# Patient Record
Sex: FEMALE | Race: BLACK OR AFRICAN AMERICAN | NOT HISPANIC OR LATINO | Employment: OTHER | URBAN - NONMETROPOLITAN AREA
[De-identification: names, ages, dates, MRNs, and addresses within clinical notes are randomized per-mention and may not be internally consistent; named-entity substitution may affect disease eponyms.]

---

## 2017-11-16 ENCOUNTER — OFFICE VISIT (OUTPATIENT)
Dept: FAMILY MEDICINE CLINIC | Facility: CLINIC | Age: 67
End: 2017-11-16

## 2017-11-16 VITALS
TEMPERATURE: 98.1 F | DIASTOLIC BLOOD PRESSURE: 68 MMHG | RESPIRATION RATE: 16 BRPM | HEIGHT: 65 IN | OXYGEN SATURATION: 99 % | BODY MASS INDEX: 30.32 KG/M2 | HEART RATE: 86 BPM | WEIGHT: 182 LBS | SYSTOLIC BLOOD PRESSURE: 110 MMHG

## 2017-11-16 DIAGNOSIS — R68.89 ITCHY EYES: ICD-10-CM

## 2017-11-16 DIAGNOSIS — H10.13 ALLERGIC CONJUNCTIVITIS OF BOTH EYES: ICD-10-CM

## 2017-11-16 DIAGNOSIS — J30.1 ACUTE ALLERGIC RHINITIS DUE TO POLLEN, UNSPECIFIED SEASONALITY: Primary | ICD-10-CM

## 2017-11-16 PROCEDURE — 96372 THER/PROPH/DIAG INJ SC/IM: CPT | Performed by: NURSE PRACTITIONER

## 2017-11-16 PROCEDURE — 99213 OFFICE O/P EST LOW 20 MIN: CPT | Performed by: NURSE PRACTITIONER

## 2017-11-16 RX ORDER — FLUTICASONE PROPIONATE 50 MCG
2 SPRAY, SUSPENSION (ML) NASAL DAILY
Qty: 16 G | Refills: 5 | Status: SHIPPED | OUTPATIENT
Start: 2017-11-16 | End: 2022-04-21

## 2017-11-16 RX ORDER — HYDROCODONE BITARTRATE AND ACETAMINOPHEN 7.5; 325 MG/1; MG/1
1 TABLET ORAL
COMMUNITY
End: 2021-05-19 | Stop reason: DRUGHIGH

## 2017-11-16 RX ORDER — OLOPATADINE HYDROCHLORIDE 2 MG/ML
1 SOLUTION/ DROPS OPHTHALMIC DAILY
Qty: 2.5 ML | Refills: 0 | Status: SHIPPED | OUTPATIENT
Start: 2017-11-16 | End: 2017-11-16 | Stop reason: ALTCHOICE

## 2017-11-16 RX ORDER — KETOTIFEN FUMARATE 0.35 MG/ML
1 SOLUTION/ DROPS OPHTHALMIC 2 TIMES DAILY
Qty: 10 ML | Refills: 0 | Status: SHIPPED | OUTPATIENT
Start: 2017-11-16 | End: 2018-01-31

## 2017-11-16 RX ORDER — ZOLPIDEM TARTRATE 10 MG/1
10 TABLET ORAL
COMMUNITY
Start: 2016-05-05

## 2017-11-16 RX ORDER — METHYLPREDNISOLONE ACETATE 80 MG/ML
80 INJECTION, SUSPENSION INTRA-ARTICULAR; INTRALESIONAL; INTRAMUSCULAR; SOFT TISSUE ONCE
Status: COMPLETED | OUTPATIENT
Start: 2017-11-16 | End: 2017-11-16

## 2017-11-16 RX ORDER — CIPROFLOXACIN HYDROCHLORIDE 3.5 MG/ML
SOLUTION/ DROPS TOPICAL
COMMUNITY
Start: 2017-11-14 | End: 2018-01-31

## 2017-11-16 RX ORDER — ERGOCALCIFEROL 1.25 MG/1
50000 CAPSULE ORAL
COMMUNITY
Start: 2017-08-15 | End: 2018-01-31 | Stop reason: SDUPTHER

## 2017-11-16 RX ORDER — NITROGLYCERIN 0.4 MG/1
0.4 TABLET SUBLINGUAL
COMMUNITY
Start: 2017-10-30 | End: 2018-10-26

## 2017-11-16 RX ORDER — ALPRAZOLAM 0.25 MG/1
0.25 TABLET ORAL
COMMUNITY
Start: 2016-05-05

## 2017-11-16 RX ORDER — ERGOCALCIFEROL 1.25 MG/1
50000 CAPSULE ORAL
COMMUNITY
Start: 2017-08-15

## 2017-11-16 RX ORDER — CETIRIZINE HYDROCHLORIDE 10 MG/1
TABLET ORAL
COMMUNITY
Start: 2017-06-30 | End: 2021-05-19

## 2017-11-16 RX ORDER — HYDROXYZINE HYDROCHLORIDE 25 MG/1
TABLET, FILM COATED ORAL
Qty: 90 TABLET | Refills: 0 | Status: SHIPPED | OUTPATIENT
Start: 2017-11-16 | End: 2018-01-31

## 2017-11-16 RX ORDER — PANTOPRAZOLE SODIUM 20 MG/1
20 TABLET, DELAYED RELEASE ORAL
COMMUNITY
Start: 2017-10-30 | End: 2018-10-31

## 2017-11-16 RX ADMIN — METHYLPREDNISOLONE ACETATE 80 MG: 80 INJECTION, SUSPENSION INTRA-ARTICULAR; INTRALESIONAL; INTRAMUSCULAR; SOFT TISSUE at 13:54

## 2017-11-16 NOTE — PROGRESS NOTES
"Subjective   Sabi Jarvis is a 67 y.o. female who presents to the office complaining of itchy eyes.    History of Present Illness     Patient states that for the past several days she has had itchy watery eyes that burn when they're watery bilaterally.  Patient states that she went to [facility] care couple days ago and they prescribed her ciprofloxacin drops, patient stated that she got better over the next couple of days but then last night and today she has been much worse where all his symptoms have returned.  Patient states that eyes are watery with clear drainage she does have a small amount of \"eye boogers\" in the morning but they are light yellow in color and her eyes are not matted together.  Patient takes Zyrtec when necessary but switched to Benadryl when necessary recently with these issues.  Patient has been using the Cipro floxacillin drops as well as warm compresses.  Patient states that the last couple of days the warm compresses have made the problems worse.  Patient states that nothing got caught in her eyes.  She states no eye pain.  Patient's main complaint is itchiness and she feels like something is underneath her eyelids.  Patient also experiencing rhinorrhea, postnasal drainage, and some sneezing.  Patient states that this summer she had her vision checked.  Patient states that she has a mild headache because of her eyes hurting.    The following portions of the patient's history were reviewed and updated as appropriate: allergies, current medications, past family history, past medical history, past social history, past surgical history and problem list.    Review of Systems   Constitutional: Negative for activity change, appetite change, chills, diaphoresis, fatigue and fever.   HENT: Positive for postnasal drip, rhinorrhea, sneezing and sore throat. Negative for congestion, ear pain, sinus pain, sinus pressure, tinnitus, trouble swallowing and voice change.    Eyes: Positive for discharge " "(watery clear), redness and itching. Negative for photophobia, pain and visual disturbance.   Respiratory: Negative for cough, chest tightness, shortness of breath and wheezing.    Cardiovascular: Negative for chest pain, palpitations and leg swelling.   Neurological: Positive for headaches (mild). Negative for dizziness, weakness and light-headedness.       History reviewed. No pertinent past medical history.    History reviewed. No pertinent family history.       Objective   /68  Pulse 86  Temp 98.1 °F (36.7 °C) (Oral)   Resp 16  Ht 65\" (165.1 cm)  Wt 182 lb (82.6 kg)  SpO2 99%  Breastfeeding? No  BMI 30.29 kg/m2  Physical Exam   Constitutional: She appears well-developed and well-nourished. No distress.   HENT:   Head: Normocephalic.   Right Ear: Hearing, tympanic membrane, external ear and ear canal normal.   Left Ear: Tympanic membrane, external ear and ear canal normal.   Nose: Mucosal edema and rhinorrhea present. Right sinus exhibits no maxillary sinus tenderness and no frontal sinus tenderness. Left sinus exhibits no maxillary sinus tenderness and no frontal sinus tenderness.   Mouth/Throat: Uvula is midline and mucous membranes are normal. Oropharyngeal exudate present. Tonsils are 0 on the right. Tonsils are 0 on the left. No tonsillar exudate.   Eyes: EOM and lids are normal. Pupils are equal, round, and reactive to light. Right eye exhibits discharge (Watery clear drainage). No foreign body present in the right eye. Left eye exhibits discharge (watery clear drainage). No foreign body present in the left eye. Right conjunctiva is injected. Left conjunctiva is injected. No scleral icterus.   Cardiovascular: Normal rate, regular rhythm, normal heart sounds and intact distal pulses.  Exam reveals no gallop and no friction rub.    No murmur heard.  Pulmonary/Chest: Effort normal and breath sounds normal. No respiratory distress. She has no wheezes. She has no rales.   Lymphadenopathy:     She " has no cervical adenopathy.   Psychiatric: She has a normal mood and affect. Her behavior is normal.   Nursing note and vitals reviewed.       PHQ-2/PHQ-9 Depression Screening 11/16/2017   Little interest or pleasure in doing things 2   Feeling down, depressed, or hopeless 3   Trouble falling or staying asleep, or sleeping too much 3   Feeling tired or having little energy 0   Poor appetite or overeating 0   Feeling bad about yourself - or that you are a failure or have let yourself or your family down 0   Trouble concentrating on things, such as reading the newspaper or watching television 0   Moving or speaking so slowly that other people could have noticed. Or the opposite - being so fidgety or restless that you have been moving around a lot more than usual 0   Thoughts that you would be better off dead, or of hurting yourself in some way 0   Total Score 8   If you checked off any problems, how difficult have these problems made it for you to do your work, take care of things at home, or get along with other people? Not difficult at all         Assessment/Plan   Sabi was seen today for itchy eye.    Diagnoses and all orders for this visit:    Acute allergic rhinitis due to pollen, unspecified seasonality  -     methylPREDNISolone acetate (DEPO-medrol) injection 80 mg; Inject 1 mL into the shoulder, thigh, or buttocks 1 (One) Time.  -     fluticasone (FLONASE) 50 MCG/ACT nasal spray; 2 sprays into each nostril Daily.    Allergic conjunctivitis of both eyes  -     ketotifen (ZADITOR) 0.025 % ophthalmic solution; Administer 1 drop to both eyes 2 (Two) Times a Day.  -     Discontinue: olopatadine (PATADAY) 0.2 % solution ophthalmic solution; Administer 1 drop to both eyes Daily. For allergic conjunctivitis  -     methylPREDNISolone acetate (DEPO-medrol) injection 80 mg; Inject 1 mL into the shoulder, thigh, or buttocks 1 (One) Time.    Itchy eyes  -     ketotifen (ZADITOR) 0.025 % ophthalmic solution; Administer 1  drop to both eyes 2 (Two) Times a Day.  -     Discontinue: olopatadine (PATADAY) 0.2 % solution ophthalmic solution; Administer 1 drop to both eyes Daily. For allergic conjunctivitis  -     hydrOXYzine (ATARAX) 25 MG tablet; Take 1-2 tablets prn TID for itching.      Allergies with allergic conjunctivitis-called pharmacy and told them to only give patient the generic of Zaditor, to hold the Pataday, prescribed Atarax at bedtime for itching that patient can take during the day but to caution with drowsiness and not to drive with, prescribed Flonase and Depo-Medrol for allergies control.  Educated to stop Benadryl she starts the hydroxyzine     Patient educated to follow-up sooner than next scheduled appointment if condition(s) worse or do not improve. Patient states understanding and is in agreeance with plan of care. An After Visit Summary was printed and given to the patient.      Current Outpatient Prescriptions:   •  ALPRAZolam (XANAX) 0.25 MG tablet, Take 0.25 mg by mouth., Disp: , Rfl:   •  aspirin 81 MG tablet, Take 81 mg by mouth., Disp: , Rfl:   •  cetirizine (zyrTEC) 10 MG tablet, TAKE 1 TABLET BY MOUTH DAILY., Disp: , Rfl:   •  ciprofloxacin (CILOXAN) 0.3 % ophthalmic solution, , Disp: , Rfl:   •  ergocalciferol (ERGOCALCIFEROL) 00090 units capsule, Take 50,000 Units by mouth., Disp: , Rfl:   •  HYDROcodone-acetaminophen (NORCO) 7.5-325 MG per tablet, Take 1 tablet by mouth., Disp: , Rfl:   •  nitroglycerin (NITROSTAT) 0.4 MG SL tablet, Place 0.4 mg under the tongue., Disp: , Rfl:   •  pantoprazole (PROTONIX) 20 MG EC tablet, Take 20 mg by mouth., Disp: , Rfl:   •  vitamin D (ERGOCALCIFEROL) 11328 units capsule capsule, , Disp: , Rfl:   •  zolpidem (AMBIEN) 10 MG tablet, Take 10 mg by mouth., Disp: , Rfl:   •  fluticasone (FLONASE) 50 MCG/ACT nasal spray, 2 sprays into each nostril Daily., Disp: 16 g, Rfl: 5  •  hydrOXYzine (ATARAX) 25 MG tablet, Take 1-2 tablets prn TID for itching., Disp: 90 tablet, Rfl:  0  •  ketotifen (ZADITOR) 0.025 % ophthalmic solution, Administer 1 drop to both eyes 2 (Two) Times a Day., Disp: 10 mL, Rfl: 0  No current facility-administered medications for this visit.       YAAKOV Pacheco        This document has been electronically signed by YAAKOV Pacheco on November 16, 2017 4:29 PM

## 2018-01-31 ENCOUNTER — OFFICE VISIT (OUTPATIENT)
Dept: OBSTETRICS AND GYNECOLOGY | Facility: CLINIC | Age: 68
End: 2018-01-31

## 2018-01-31 VITALS
HEART RATE: 78 BPM | SYSTOLIC BLOOD PRESSURE: 124 MMHG | HEIGHT: 65 IN | DIASTOLIC BLOOD PRESSURE: 70 MMHG | BODY MASS INDEX: 30.12 KG/M2 | WEIGHT: 180.8 LBS | RESPIRATION RATE: 18 BRPM

## 2018-01-31 DIAGNOSIS — L65.9 HAIR THINNING: ICD-10-CM

## 2018-01-31 DIAGNOSIS — Z12.31 SCREENING MAMMOGRAM, ENCOUNTER FOR: ICD-10-CM

## 2018-01-31 DIAGNOSIS — R21 RASH AND NONSPECIFIC SKIN ERUPTION: Primary | ICD-10-CM

## 2018-01-31 PROCEDURE — 99213 OFFICE O/P EST LOW 20 MIN: CPT | Performed by: NURSE PRACTITIONER

## 2018-01-31 RX ORDER — CLOTRIMAZOLE AND BETAMETHASONE DIPROPIONATE 10; .64 MG/G; MG/G
CREAM TOPICAL EVERY 12 HOURS SCHEDULED
Qty: 45 G | Refills: 0 | Status: SHIPPED | OUTPATIENT
Start: 2018-01-31 | End: 2019-03-26

## 2018-02-13 ENCOUNTER — OFFICE VISIT (OUTPATIENT)
Dept: OBSTETRICS AND GYNECOLOGY | Facility: CLINIC | Age: 68
End: 2018-02-13

## 2018-02-13 VITALS
SYSTOLIC BLOOD PRESSURE: 128 MMHG | BODY MASS INDEX: 30.35 KG/M2 | HEART RATE: 74 BPM | DIASTOLIC BLOOD PRESSURE: 74 MMHG | WEIGHT: 182.2 LBS | RESPIRATION RATE: 16 BRPM | HEIGHT: 65 IN

## 2018-02-13 DIAGNOSIS — Z12.72 VAGINAL PAP SMEAR: ICD-10-CM

## 2018-02-13 DIAGNOSIS — Z01.419 ENCOUNTER FOR GYNECOLOGICAL EXAMINATION: Primary | ICD-10-CM

## 2018-02-13 PROCEDURE — G0101 CA SCREEN;PELVIC/BREAST EXAM: HCPCS | Performed by: NURSE PRACTITIONER

## 2018-02-13 PROCEDURE — G0123 SCREEN CERV/VAG THIN LAYER: HCPCS | Performed by: PATHOLOGY

## 2018-02-13 NOTE — PROGRESS NOTES
Subjective   Chief Complaint   Patient presents with   • Gynecologic Exam     well woman annual visit     Sabi Jarvis is a 67 y.o. year old  presenting to be seen for her annual exam.  She denies any complaints. Previous rash under left breast is improving. She has not used the medication regularly as she went out of town and forgot to take it. She has resumed regular use and is noting great improvement.     She is sexually active.  In the past 12 months there has not been new sexual partners.  Condoms are not typically used.  She would not like to be screened for STD's at today's exam.     She exercises regularly: yes.  She wears her seat belt:yes.  She has concerns about domestic violence: no.  She is taking Vit D and Calcium:yes  Last colonoscopy:   Last DEXA: Unsure of date  Last MM2016, hx of breast cyst with removal  Last PAP: possibly  with Dr. Mosley  Hx of abnormal pap: No      SURGICAL MENOPAUSE  LMP: Hysterectomy without BSO at age 36 secondary to fibroids and bleeding concerns.    OB History      Para Term  AB Living    1 1        SAB TAB Ectopic Multiple Live Births                  No Additional Complaints Reported    The following portions of the patient's history were reviewed and updated as appropriate:problem list, current medications, allergies, past family history, past medical history, past social history and past surgical history.    Smoking status: Former Smoker                                                              Packs/day: 0.00      Years: 0.00      Smokeless status: Never Used                        Review of Systems   Constitutional: Negative.    Respiratory: Negative.    Cardiovascular: Negative.    Gastrointestinal: Negative.  Negative for constipation and diarrhea.   Endocrine: Negative.    Genitourinary: Negative.  Negative for vaginal discharge. Dyspareunia: vaginal dryness, used premarin in the past with mildi improvement   "  Musculoskeletal: Positive for back pain.   Skin: Positive for rash (improving, see HPI).   Neurological: Negative for dizziness, syncope, light-headedness and headaches.   Psychiatric/Behavioral: Negative for suicidal ideas. The patient is not nervous/anxious.         Anxiety and depression currently controlled         Objective   /74 (BP Location: Right arm, Patient Position: Sitting, Cuff Size: Adult)  Pulse 74  Resp 16  Ht 165.1 cm (65\")  Wt 82.6 kg (182 lb 3.2 oz)  LMP  (LMP Unknown) Comment: around age 36   Breastfeeding? No  BMI 30.32 kg/m2    General:  well developed; well nourished  no acute distress   Skin:  No suspicious lesions seen  hyperpigmented rash beneath left breast improving. No excoriations or lesions   Cardiac: Heart sounds are normal.  Regular rate and rhythm without murmur, gallop or rub.   Resp:  Normal expansion.  Clear to auscultation.  No rales, rhonchi, or wheezing.   Thyroid: not examined   Breasts:  Examined in supine position  Symmetric without masses or skin dimpling  Nipples normal without inversion, lesions or discharge  There are no palpable axillary nodes   Abdomen: soft, non-tender; no masses  no umbilical or inginual hernias are present  no hepato-splenomegaly  Normal findings: bowel sounds normal   Psych: alert,oriented, in NAD with a full range of affect, normal behavior and no psychotic features   Pelvis: Clinical staff was present for exam  External genitalia:  normal appearance of the external genitalia including Bartholin's and [place]'s glands.  :  urethral meatus normal;  Vaginal:  Atrophic mucosa without prolapse or lesions. Some mild white discharge noted.  Cervix:  absent.  Uterus:  absent.  Adnexa:  non palpable bilaterally.  Rectal:  anus visually normal appearing. recto-vaginal exam unremarkable and confirms findings; guaiac negative;     Lab Review   CBC, CMP and UA    Imaging  No data reviewed       Diagnoses and all orders for this " visit:    Encounter for gynecological examination  -     Liquid-based Pap Smear, Screening - ThinPrep Vial, Vagina    Vaginal Pap smear  -     Liquid-based Pap Smear, Screening - ThinPrep Vial, Vagina    Pap results: I will send card in mail or call if abnormal. RTC every 2 years for well woman exam. MMG is scheduled on 3/1/18.       This note was electronically signed.    Dyan Infante, APRN  February 13, 2018

## 2018-02-20 LAB
LAB AP CASE REPORT: NORMAL
LAB AP GYN ADDITIONAL INFORMATION: NORMAL
Lab: NORMAL
PATH INTERP SPEC-IMP: NORMAL
STAT OF ADQ CVX/VAG CYTO-IMP: NORMAL

## 2019-03-26 ENCOUNTER — OFFICE VISIT (OUTPATIENT)
Dept: OBSTETRICS AND GYNECOLOGY | Facility: CLINIC | Age: 69
End: 2019-03-26

## 2019-03-26 VITALS
BODY MASS INDEX: 30.72 KG/M2 | HEART RATE: 78 BPM | TEMPERATURE: 98.2 F | WEIGHT: 184.4 LBS | HEIGHT: 65 IN | DIASTOLIC BLOOD PRESSURE: 78 MMHG | SYSTOLIC BLOOD PRESSURE: 120 MMHG

## 2019-03-26 DIAGNOSIS — R10.2 PELVIC PAIN: Primary | ICD-10-CM

## 2019-03-26 DIAGNOSIS — K59.03 DRUG-INDUCED CONSTIPATION: ICD-10-CM

## 2019-03-26 PROCEDURE — 99214 OFFICE O/P EST MOD 30 MIN: CPT | Performed by: NURSE PRACTITIONER

## 2019-03-26 RX ORDER — RANITIDINE 150 MG/1
TABLET ORAL
COMMUNITY
Start: 2019-02-28 | End: 2021-05-19

## 2019-03-26 RX ORDER — OMEPRAZOLE 20 MG/1
CAPSULE, DELAYED RELEASE ORAL
COMMUNITY
Start: 2019-02-28 | End: 2021-05-19

## 2019-03-26 NOTE — PROGRESS NOTES
"Subjective   Saib Jarvis is a 68 y.o. female.     History of Present Illness   Pt presents with complaints of left sided tenderness in the pelvis and abdomen. She states this has been occurring off and on for 2 months now. She describes it as a \"pressure\", worsened by leaning this area against something like the kitchen counter. Has had occasional sharp, shooting pains. Last time was yesterday. Pt denies any pain today. Has not taken anything for the pain.     Pt does admit to having constipation issues. On opioids daily for chronic pain syndrome. Last colonscopy in 2016. Took Moura Tablets yesterday but has not had a bowel movement.     Pt is not sexually active. Denies vaginal discharge, itching, burning, odor. Denies dysuria, urgency, frequency.       S/P hysterectomy without BSO at age 36.     The following portions of the patient's history were reviewed and updated as appropriate: allergies, current medications, past family history, past medical history, past social history, past surgical history and problem list.    Review of Systems   Constitutional: Negative.  Negative for chills and fever.   Respiratory: Negative.    Cardiovascular: Negative.    Gastrointestinal: Positive for abdominal pain (LLQ) and constipation. Negative for anal bleeding, blood in stool, diarrhea, nausea and vomiting.   Genitourinary: Positive for pelvic pain (left sided). Negative for dysuria, flank pain, frequency, hematuria, urgency, vaginal discharge and vaginal pain.       Objective    Vitals:    03/26/19 0841   BP: 120/78   Pulse: 78   Temp: 98.2 °F (36.8 °C)         03/26/19  0841   Weight: 83.6 kg (184 lb 6.4 oz)     Body mass index is 30.69 kg/m².    Physical Exam   Constitutional: She is oriented to person, place, and time. She appears well-developed and well-nourished.   Cardiovascular: Normal rate, regular rhythm and normal heart sounds.   Pulmonary/Chest: Effort normal and breath sounds normal.   Abdominal: Soft. She " exhibits no distension and no mass. Bowel sounds are decreased. There is tenderness in the left lower quadrant. There is no rigidity, no rebound and no guarding.       Genitourinary: There is no rash, tenderness, lesion or injury on the right labia. There is no rash, tenderness, lesion or injury on the left labia. Uterus is absent.   Cervix is absent. Right adnexum is non-palpable.Left adnexum is non-palpable.Vagina exhibits no lesion and no loss of rugae (mild). No erythema, tenderness or bleeding in the vagina. No foreign body in the vagina. No signs of injury around the vagina. No vaginal discharge found.   Neurological: She is alert and oriented to person, place, and time.   Vitals reviewed.        Assessment/Plan   Sabi was seen today for pelvic pain.    Diagnoses and all orders for this visit:    Pelvic pain  -     US Non-ob Transvaginal    Drug-induced constipation    Obtain TVUS for evaluation of the ovaries as they are not palpable on exam. Last viewed on TVUS in 2014 were normal. I will call pt with results and discuss next steps PRN.     We discussed possible etiology of her pain being constipation. I discussed the anatomy that would cause constipation to be uncomfortable in the LLQ. I provided information on Miralax. Instructed pt to take one scoop a day for 7 days to help relieve constipation. Can repeat PRN. If ovaries normal on TVUS and pain persists, follow up with PCP for further evaluation of pain. Pt agrees with plan of care.

## 2020-04-01 RX ORDER — FLUCONAZOLE 150 MG/1
150 TABLET ORAL ONCE
Qty: 2 TABLET | Refills: 0 | Status: SHIPPED | OUTPATIENT
Start: 2020-04-01 | End: 2020-04-01

## 2021-05-19 ENCOUNTER — LAB (OUTPATIENT)
Dept: LAB | Facility: OTHER | Age: 71
End: 2021-05-19

## 2021-05-19 ENCOUNTER — OFFICE VISIT (OUTPATIENT)
Dept: OBSTETRICS AND GYNECOLOGY | Facility: CLINIC | Age: 71
End: 2021-05-19

## 2021-05-19 VITALS
HEART RATE: 75 BPM | BODY MASS INDEX: 29.22 KG/M2 | DIASTOLIC BLOOD PRESSURE: 88 MMHG | WEIGHT: 175.4 LBS | SYSTOLIC BLOOD PRESSURE: 136 MMHG | HEIGHT: 65 IN

## 2021-05-19 DIAGNOSIS — D48.5 NEOPLASM OF UNCERTAIN BEHAVIOR OF SKIN: ICD-10-CM

## 2021-05-19 DIAGNOSIS — Z01.419 ENCOUNTER FOR WELL WOMAN EXAM WITH ROUTINE GYNECOLOGICAL EXAM: Primary | ICD-10-CM

## 2021-05-19 PROBLEM — G89.29 CHRONIC RIGHT-SIDED LOW BACK PAIN WITH RIGHT-SIDED SCIATICA: Status: ACTIVE | Noted: 2020-10-27

## 2021-05-19 PROBLEM — M54.41 CHRONIC RIGHT-SIDED LOW BACK PAIN WITH RIGHT-SIDED SCIATICA: Status: ACTIVE | Noted: 2020-10-27

## 2021-05-19 PROBLEM — G56.03 BILATERAL CARPAL TUNNEL SYNDROME: Status: ACTIVE | Noted: 2019-07-08

## 2021-05-19 PROCEDURE — G0101 CA SCREEN;PELVIC/BREAST EXAM: HCPCS | Performed by: NURSE PRACTITIONER

## 2021-05-19 RX ORDER — HYDROCODONE BITARTRATE AND ACETAMINOPHEN 10; 325 MG/1; MG/1
1 TABLET ORAL 4 TIMES DAILY
COMMUNITY
Start: 2021-04-24

## 2021-05-19 RX ORDER — NAPROXEN 500 MG/1
500 TABLET ORAL 2 TIMES DAILY
COMMUNITY
Start: 2021-04-21 | End: 2021-11-16 | Stop reason: ALTCHOICE

## 2021-05-19 RX ORDER — ALLOPURINOL 300 MG/1
TABLET ORAL
COMMUNITY
Start: 2021-02-23

## 2021-05-19 NOTE — PROGRESS NOTES
Subjective   Chief Complaint   Patient presents with   • Gynecologic Exam     WWE   • mole     has two that she wants looked at     Shannan Webb is a 70 y.o. year old  presenting to be seen for her annual exam.  She denies any complaints. But wants me to assess a mole on the left beast and one in the right groin. Groin has been present and unchanging for years. Left breast pt hadn't ever noticed until 3 weeks ago. Not changed in 3 weeks but feels it showed up rather quickly. Neither itch, nor are they painful.     She is sexually active.  In the past 12 months there has not been new sexual partners.  Condoms are not typically used.  She would not like to be screened for STD's at today's exam.     She exercises regularly: yes.  She wears her seat belt:yes.  She has concerns about domestic violence: no.  She is taking Vit D and Calcium:yes  Last colonoscopy: 2020  Last DEXA: 12/3/2020, osteopenia  Last MM/3/2020, BIRADS 2, hx of breast cyst with removal  Last PAP:18  Hx of abnormal pap: No      SURGICAL MENOPAUSE  LMP: Hysterectomy without BSO at age 36 secondary to fibroids and bleeding concerns.    OB History        1    Para   1    Term   1            AB        Living   1       SAB        TAB        Ectopic        Molar        Multiple        Live Births                    No Additional Complaints Reported    The following portions of the patient's history were reviewed and updated as appropriate:problem list, current medications, allergies, past family history, past medical history, past social history and past surgical history.    Social History    Tobacco Use      Smoking status: Former Smoker      Smokeless tobacco: Never Used      Tobacco comment: quit 20 + years ago    Review of Systems   Constitutional: Negative.    Respiratory: Negative.    Cardiovascular: Negative.    Gastrointestinal: Negative.  Negative for constipation and diarrhea.   Endocrine: Negative.   "  Genitourinary: Negative.  Negative for vaginal discharge. Dyspareunia:     Musculoskeletal: Positive for back pain (chronic).   Skin: Negative for rash.        \"mole\"   Neurological: Negative for dizziness, syncope, light-headedness and headaches.   Psychiatric/Behavioral: Negative for suicidal ideas. The patient is not nervous/anxious.         Anxiety and depression currently controlled         Objective   /88   Pulse 75   Ht 165.1 cm (65\")   Wt 79.6 kg (175 lb 6.4 oz)   LMP  (LMP Unknown) Comment: around age 36, wo BSO  Breastfeeding No   BMI 29.19 kg/m²     General:  well developed; well nourished  no acute distress   Skin:  Mole(s) noted on left breast, 11:00 position, high toward chest wall. irregular borders, dark  brown, slightly rough texture. Right groin, small 2mm, smooth texture and borders   Several other nevi under the breasts     Cardiac: Heart sounds are normal.  Regular rate and rhythm without murmur, gallop or rub.   Resp:  Normal expansion.  Clear to auscultation.  No rales, rhonchi, or wheezing.   Thyroid: not examined   Breasts:  Examined in supine position  Symmetric without masses or skin dimpling  Nipples normal without inversion, lesions or discharge  There are no palpable axillary nodes   Abdomen: soft, non-tender; no masses  no umbilical or inginual hernias are present  no hepato-splenomegaly  Normal findings: bowel sounds normal   Psych: alert,oriented, in NAD with a full range of affect, normal behavior and no psychotic features   Pelvis: Clinical staff was present for exam  External genitalia:  normal appearance of the external genitalia including Bartholin's and [place]'s glands.  :  urethral meatus normal;  Vaginal:  Mildly atrophic mucosa without prolapse or lesions  Cervix:  absent.  Uterus:  absent.  Adnexa:  non palpable bilaterally.  Rectal:  anus visually normal appearing. recto-vaginal exam not performed     Lab Review   No data reviewed    Imaging  DEXA  Mammogram " report       Diagnoses and all orders for this visit:    Encounter for well woman exam with routine gynecological exam  -     Liquid-based Pap Smear, Screening    Neoplasm of uncertain behavior of skin  -     Ambulatory Referral to Dermatology    Pap results: I will send card in mail or call if abnormal. RTC every 2 years for well woman exam.    MMG recommended annually. Is up to date at this time. DEXA up to date as well. Due 12/2022. Osteopenia recommend continuing Vit D and weight bearing exercises.     Pt has had COVID vaccine.     Overweight with BMI 29.2. She voices understanding of need for a healthy balanced diet and exercise.     FU with PCP for any concerns and routine visits/labs.     I discussed skin findings. I recommend she go see a dermatologist for further evaluation and management, particularly for the one on the breast. She has seen Dr. Jansen before. We will refer to GLORIA Lewis at his office. Pt agrees with this plan of care.     This note was electronically signed.    Dyan Infante, APRN  May 19, 2021

## 2021-05-21 LAB
LAB AP CASE REPORT: NORMAL
PATH INTERP SPEC-IMP: NORMAL

## 2021-10-12 ENCOUNTER — OFFICE VISIT (OUTPATIENT)
Dept: ORTHOPEDIC SURGERY | Facility: CLINIC | Age: 71
End: 2021-10-12

## 2021-10-12 VITALS
OXYGEN SATURATION: 96 % | DIASTOLIC BLOOD PRESSURE: 80 MMHG | WEIGHT: 181.9 LBS | HEART RATE: 74 BPM | HEIGHT: 65 IN | BODY MASS INDEX: 30.31 KG/M2 | SYSTOLIC BLOOD PRESSURE: 126 MMHG

## 2021-10-12 DIAGNOSIS — M76.822 INSUFFICIENCY OF LEFT POSTERIOR TIBIAL TENDON: Primary | ICD-10-CM

## 2021-10-12 DIAGNOSIS — M25.572 CHRONIC PAIN OF LEFT ANKLE: ICD-10-CM

## 2021-10-12 DIAGNOSIS — G89.29 CHRONIC PAIN OF LEFT ANKLE: ICD-10-CM

## 2021-10-12 DIAGNOSIS — M21.42 ACQUIRED FLAT FOOT, LEFT: ICD-10-CM

## 2021-10-12 PROCEDURE — 99203 OFFICE O/P NEW LOW 30 MIN: CPT | Performed by: ORTHOPAEDIC SURGERY

## 2021-10-12 NOTE — PROGRESS NOTES
Sabi Jarvis is a 71 y.o. female   Primary provider:  Candice Milian MD       Chief Complaint   Patient presents with   • Left Ankle - Pain       HISTORY OF PRESENT ILLNESS:   Patient is here today for left ankle pain. She brought disc with xrays.  Patient was seen by orthopedics at another facility.  She has been having pain on the medial aspect of her left ankle for at least 3 months.  No specific injury that she recalls.  She reports a pulling and no swelling on the inside of her right ankle.  Pain has caused her to be decreased in activity.  She has worn a fracture boot but it rubbed on her ankle and she could not tolerate wearing it.  She states it did not really help any.  She has tried Naprosyn, ice, rest without significant improvement.  She states that when she rests consistently she may be feels a little bit better.  However, she still has pain with activity.    Pain  This is a chronic problem. The current episode started more than 1 year ago. The problem occurs intermittently. The problem has been gradually worsening. Associated symptoms include arthralgias. The symptoms are aggravated by standing. She has tried ice, heat and rest for the symptoms. The treatment provided mild relief.        CONCURRENT MEDICAL HISTORY:    Past Medical History:   Diagnosis Date   • Acid reflux    • Allergic rhinitis    • Anxiety    • Depression    • Fibrocystic breast    • Senile osteoporosis    • Uterine fibroid    • Yeast infection        Allergies   Allergen Reactions   • Erythromycin GI Intolerance         Current Outpatient Medications:   •  allopurinol (ZYLOPRIM) 300 MG tablet, TAKE 1 TABLET BY MOUTH ONCE DAILY FOR GOUT, Disp: , Rfl:   •  ALPRAZolam (XANAX) 0.25 MG tablet, Take 0.25 mg by mouth., Disp: , Rfl:   •  fluticasone (FLONASE) 50 MCG/ACT nasal spray, 2 sprays into each nostril Daily., Disp: 16 g, Rfl: 5  •  HYDROcodone-acetaminophen (NORCO)  MG per tablet, Take 1 tablet by mouth 4 (Four) Times  "a Day., Disp: , Rfl:   •  naproxen (NAPROSYN) 500 MG tablet, Take 500 mg by mouth 2 (Two) Times a Day., Disp: , Rfl:   •  vitamin D (ERGOCALCIFEROL) 38562 units capsule capsule, , Disp: , Rfl:   •  zolpidem (AMBIEN) 10 MG tablet, Take 10 mg by mouth., Disp: , Rfl:   •  aspirin 81 MG tablet, Take 81 mg by mouth., Disp: , Rfl:     Past Surgical History:   Procedure Laterality Date   • BREAST BIOPSY Bilateral    • BREAST SURGERY     • SUBTOTAL HYSTERECTOMY      wo BSO       Family History   Problem Relation Age of Onset   • Osteoporosis Mother    • Coronary artery disease Mother    • Cancer Other    • Diabetes Other    • Hypertension Other    • Ulcers Other    • Other Other         colon problems       Social History     Socioeconomic History   • Marital status:    Tobacco Use   • Smoking status: Former Smoker   • Smokeless tobacco: Never Used   • Tobacco comment: quit 20 + years ago   Substance and Sexual Activity   • Alcohol use: Yes     Comment: occasionally    • Drug use: No   • Sexual activity: Not Currently     Partners: Male     Birth control/protection: Surgical        Review of Systems   Constitutional: Negative.    HENT: Negative.    Eyes: Negative.    Respiratory: Negative.    Cardiovascular: Negative.    Gastrointestinal: Negative.    Endocrine: Negative.    Genitourinary: Negative.    Musculoskeletal: Positive for arthralgias.        Stiffness   Skin: Negative.    Allergic/Immunologic: Negative.    Neurological: Negative.    Hematological: Negative.    Psychiatric/Behavioral: Positive for sleep disturbance.        Anxiety/Depression       PHYSICAL EXAMINATION:       /80   Pulse 74   Ht 165.1 cm (65\")   Wt 82.5 kg (181 lb 14.4 oz)   LMP  (LMP Unknown) Comment: around age 36, wo BSO  SpO2 96%   BMI 30.27 kg/m²     Physical Exam  Constitutional:       General: She is not in acute distress.     Appearance: Normal appearance.   Pulmonary:      Effort: Pulmonary effort is normal. No " respiratory distress.   Neurological:      Mental Status: She is alert and oriented to person, place, and time.         GAIT:     []  Normal  []  Antalgic    Assistive device: []  None  []  Walker     []  Crutches  []  Cane     []  Wheelchair  []  Stretcher    Left Ankle Exam     Comments:  Mild diffuse tenderness along the posterior aspect of the medial malleolus.  She has flexible flatfoot deformity.  She does have good ankle plantarflexion and dorsiflexion.  Good stability with inversion and eversion.  Good distal pulses and sensation.  Mild diffuse swelling and no erythema.                  XR LEFT FOOT  Three views of left foot plus AP ankle show planovalgus deformity with lateral peritalar subluxation. No acute findings.       Indication: Ankle pain with no reported trauma.     Technique: GE 3T. Prone ax FS PD. Supine sag T1/FS T2, ax/cor FS T2, no contrast.     Findings: The posterior tibial tendon is swollen and abnormal in morphology on series 4, images 8-17, consistent with partial tearing, perhaps a vertical split tear. There is also fluid signal in the PTT sheath.     The Achilles tendon is normal in morphology and signal. The peroneal and anterior tendon groups are normal. Remaining medial ankle tendons are unremarkable.     Series 2, image 17 and series 5, image 26 demonstrate focal marrow edema within the lateral aspect of the talar dome, suggesting overlying chondral thinning. There is normal signal in the sinus tarsi. There is an os trigonum, normal variant.     Series 4, image 25 and series 3, image 10 demonstrate a 1 cm oval fluid collection at the plantar aspect of the midfoot which is suspected to represent a small synovial cyst of the first TMT joint.     No other marrow edema is identified. The deltoid ligament components are maintained. ATFL fibers are preserved. No arthritic change or plantar fascia inflammation is evident. There is a plantar calcaneal spur.     IMPRESSION:     1. Partial  tearing posterior tibial tendon with associated tenosynovitis.   2. Lateral talar dome chondral thinning suspected.   3. Small probable synovial cyst plantar aspect first TMT articulation.     [id number]  Specimen Collected: 09/17/21  3:54 PM Last Resulted: 09/17/21  4:02 PM         ASSESSMENT:    Diagnoses and all orders for this visit:    Insufficiency of left posterior tibial tendon  -     Ambulatory Referral to Physical Therapy Evaluate and treat    Chronic pain of left ankle  -     Ambulatory Referral to Physical Therapy Evaluate and treat    Acquired flat foot, left  -     Ambulatory Referral to Physical Therapy Evaluate and treat          PLAN    We discussed possible orthotics to help with medial arch support.  We also discussed general strength and conditioning exercises.  She is going to be careful with shoe shoe wear to ensure that she has adequate medial arch support.  We discussed the possibility of further evaluation for reconstructive options.  We will try physical therapy for general strength and conditioning exercises as well as home exercise program.    PT:   ROM/STR exercises left ankle   Conditioning exercises   Has post tibial insuffiency   Trying inserts for support of medial arch.    Return in about 6 weeks (around 11/23/2021) for recheck.    Eric Jerez MD

## 2021-11-16 ENCOUNTER — OFFICE VISIT (OUTPATIENT)
Dept: ORTHOPEDIC SURGERY | Facility: CLINIC | Age: 71
End: 2021-11-16

## 2021-11-16 VITALS — BODY MASS INDEX: 30.16 KG/M2 | WEIGHT: 181 LBS | HEIGHT: 65 IN

## 2021-11-16 DIAGNOSIS — M21.42 ACQUIRED FLAT FOOT, LEFT: ICD-10-CM

## 2021-11-16 DIAGNOSIS — M25.572 CHRONIC PAIN OF LEFT ANKLE: Primary | ICD-10-CM

## 2021-11-16 DIAGNOSIS — G89.29 CHRONIC PAIN OF LEFT ANKLE: Primary | ICD-10-CM

## 2021-11-16 DIAGNOSIS — M76.822 INSUFFICIENCY OF LEFT POSTERIOR TIBIAL TENDON: ICD-10-CM

## 2021-11-16 PROCEDURE — 99214 OFFICE O/P EST MOD 30 MIN: CPT | Performed by: ORTHOPAEDIC SURGERY

## 2021-11-16 RX ORDER — MELOXICAM 15 MG/1
TABLET ORAL
Qty: 30 TABLET | Refills: 3 | Status: SHIPPED | OUTPATIENT
Start: 2021-11-16 | End: 2022-04-21

## 2021-11-16 NOTE — PROGRESS NOTES
"Sabi Jarvis is a 71 y.o. female returns for     Chief Complaint   Patient presents with   • Left Ankle - Follow-up       HISTORY OF PRESENT ILLNESS:  Patient in for follow up on left ankle pain  She states that she is doing better.  Pain is improving.    She does state that the anti-inflammatory medicine she was taking does not seem to be effective.  She is wearing a shoe insert on the left side.  She does think that physical therapy was helpful.       CONCURRENT MEDICAL HISTORY:    The following portions of the patient's history were reviewed and updated as appropriate: allergies, current medications, past family history, past medical history, past social history, past surgical history and problem list.     ROS  No fevers or chills.  No chest pain or shortness of air.  No GI or  disturbances.    PHYSICAL EXAMINATION:       Ht 165.1 cm (65\")   Wt 82.1 kg (181 lb)   LMP  (LMP Unknown) Comment: around age 36, wo BSO  BMI 30.12 kg/m²     Physical Exam  Constitutional:       General: She is not in acute distress.     Appearance: Normal appearance.   Pulmonary:      Effort: Pulmonary effort is normal. No respiratory distress.   Neurological:      Mental Status: She is alert and oriented to person, place, and time.         GAIT:     []  Normal  []  Antalgic    Assistive device: []  None  []  Walker     []  Crutches  []  Cane     []  Wheelchair  []  Stretcher    Left Ankle Exam     Comments:  Mild diffuse tenderness along the posterior aspect of the medial malleolus.  She has flexible flatfoot deformity.  She does have good ankle plantarflexion and dorsiflexion.  Good stability with inversion and eversion.  Good distal pulses and sensation.  No significant swelling and no erythema.                      ASSESSMENT:    Diagnoses and all orders for this visit:    Chronic pain of left ankle    Insufficiency of left posterior tibial tendon    Acquired flat foot, left    Other orders  -     meloxicam (MOBIC) 15 MG tablet; " 1 PO Daily with food.          PLAN    We discussed changing anti-inflammatory medicine and she was written a prescription for meloxicam.  She will continue using an insert for support in her left shoe.  Continue with home exercises and general strength and conditioning exercises.  Slowly increase activity as pain allows.  Follow-up as needed.      Return if symptoms worsen or fail to improve, for recheck.    Eric Jerez MD

## 2022-04-21 ENCOUNTER — OFFICE VISIT (OUTPATIENT)
Dept: FAMILY MEDICINE CLINIC | Facility: CLINIC | Age: 72
End: 2022-04-21

## 2022-04-21 VITALS
DIASTOLIC BLOOD PRESSURE: 82 MMHG | OXYGEN SATURATION: 97 % | HEIGHT: 65 IN | BODY MASS INDEX: 30.16 KG/M2 | WEIGHT: 181 LBS | SYSTOLIC BLOOD PRESSURE: 124 MMHG | HEART RATE: 80 BPM

## 2022-04-21 DIAGNOSIS — B37.2 YEAST INFECTION OF THE SKIN: Primary | ICD-10-CM

## 2022-04-21 PROCEDURE — 99213 OFFICE O/P EST LOW 20 MIN: CPT | Performed by: NURSE PRACTITIONER

## 2022-04-21 RX ORDER — NAPROXEN 375 MG/1
375 TABLET ORAL
COMMUNITY

## 2022-04-21 RX ORDER — FLUCONAZOLE 150 MG/1
150 TABLET ORAL ONCE
Qty: 1 TABLET | Refills: 0 | Status: SHIPPED | OUTPATIENT
Start: 2022-04-21 | End: 2022-04-21

## 2022-04-21 RX ORDER — KETOCONAZOLE 20 MG/G
1 CREAM TOPICAL DAILY
Qty: 80 G | Refills: 0 | Status: SHIPPED | OUTPATIENT
Start: 2022-04-21 | End: 2022-12-15

## 2022-04-21 NOTE — PROGRESS NOTES
CC: Rash (Under both breast, x2 weeks, itches and burns )      Subjective:  Sabi Jarvis is a 71 y.o. female who presents for     Patient of Dr. Milian presents office with complaints of rash under both breasts x2 weeks.  She states that it itches and burns.    Rash  This is a new problem. The current episode started 1 to 4 weeks ago. Location: Under breasts. The rash is characterized by burning and itchiness. She was exposed to nothing. Pertinent negatives include no anorexia, congestion, cough, diarrhea, eye pain, facial edema, fatigue, fever, joint pain, nail changes, rhinorrhea, shortness of breath, sore throat or vomiting. Treatments tried: Neosporin. The treatment provided no relief.       The following portions of the patient's history were reviewed and updated as appropriate: allergies, current medications, past family history, past medical history, past social history, past surgical history and problem list.    Past Medical History:   Diagnosis Date   • Acid reflux    • Allergic rhinitis    • Anxiety    • Depression    • Fibrocystic breast    • Senile osteoporosis    • Uterine fibroid    • Yeast infection          Current Outpatient Medications:   •  allopurinol (ZYLOPRIM) 300 MG tablet, TAKE 1 TABLET BY MOUTH ONCE DAILY FOR GOUT, Disp: , Rfl:   •  ALPRAZolam (XANAX) 0.25 MG tablet, Take 0.25 mg by mouth., Disp: , Rfl:   •  HYDROcodone-acetaminophen (NORCO)  MG per tablet, Take 1 tablet by mouth 4 (Four) Times a Day., Disp: , Rfl:   •  naproxen (NAPROSYN) 375 MG tablet, Take 375 mg by mouth., Disp: , Rfl:   •  vitamin D (ERGOCALCIFEROL) 78302 units capsule capsule, , Disp: , Rfl:   •  zolpidem (AMBIEN) 10 MG tablet, Take 10 mg by mouth., Disp: , Rfl:   •  fluconazole (Diflucan) 150 MG tablet, Take 1 tablet by mouth 1 (One) Time for 1 dose., Disp: 1 tablet, Rfl: 0  •  ketoconazole (NIZORAL) 2 % cream, Apply 1 application topically to the appropriate area as directed Daily., Disp: 80 g, Rfl: 0    Review  "of Systems    Review of Systems   Constitutional: Negative.  Negative for fatigue and fever.   HENT: Negative.  Negative for congestion, rhinorrhea and sore throat.    Eyes: Negative.  Negative for pain.   Respiratory: Negative.  Negative for cough and shortness of breath.    Cardiovascular: Negative.    Gastrointestinal: Negative.  Negative for anorexia, diarrhea and vomiting.   Endocrine: Negative.    Genitourinary: Negative.    Musculoskeletal: Negative.  Negative for joint pain.   Skin: Positive for rash. Negative for nail changes.   Allergic/Immunologic: Negative.    Neurological: Negative.    Hematological: Negative.    Psychiatric/Behavioral: Negative.    All other systems reviewed and are negative.      Objective  Vitals:    04/21/22 1334   BP: 124/82   Pulse: 80   SpO2: 97%   Weight: 82.1 kg (181 lb)   Height: 165.1 cm (65\")     Body mass index is 30.12 kg/m².    Physical Exam    Physical Exam  Vitals and nursing note reviewed.   Constitutional:       General: She is not in acute distress.     Appearance: Normal appearance. She is not ill-appearing, toxic-appearing or diaphoretic.   HENT:      Head: Normocephalic and atraumatic.   Cardiovascular:      Rate and Rhythm: Normal rate and regular rhythm.      Pulses: Normal pulses.      Heart sounds: Normal heart sounds.   Pulmonary:      Effort: Pulmonary effort is normal. No respiratory distress.      Breath sounds: Normal breath sounds. No stridor. No wheezing, rhonchi or rales.   Chest:      Chest wall: No tenderness.      Comments: Pt has large pendulous breast. There is red rash noted under each breast with erythema and satellite lesions noted.   Musculoskeletal:      Cervical back: Normal range of motion and neck supple.   Neurological:      Mental Status: She is alert.             Diagnoses and all orders for this visit:    1. Yeast infection of the skin (Primary)  -     fluconazole (Diflucan) 150 MG tablet; Take 1 tablet by mouth 1 (One) Time for 1 " dose.  Dispense: 1 tablet; Refill: 0  -     ketoconazole (NIZORAL) 2 % cream; Apply 1 application topically to the appropriate area as directed Daily.  Dispense: 80 g; Refill: 0         With yeast infection of the skin.  We will treat with Diflucan 150 mg tablet and ketoconazole cream.  Patient instructed on how to take/use these medications and possible side effects.  Advised to keep area clean and dry.  She was advised to use deodorant in that area to help keep her dry.  Patient to keep scheduled follow-up with her primary care provider Dr. Milian.  Answered all questions.  Patient verbalized understanding of plan of care.        This document has been electronically signed by YAAKOV Self on April 21, 2022 13:46 CDT

## 2022-12-15 ENCOUNTER — OFFICE VISIT (OUTPATIENT)
Dept: GASTROENTEROLOGY | Facility: CLINIC | Age: 72
End: 2022-12-15

## 2022-12-15 VITALS
HEIGHT: 65 IN | SYSTOLIC BLOOD PRESSURE: 140 MMHG | HEART RATE: 81 BPM | WEIGHT: 186 LBS | BODY MASS INDEX: 30.99 KG/M2 | DIASTOLIC BLOOD PRESSURE: 80 MMHG

## 2022-12-15 DIAGNOSIS — K59.00 CONSTIPATION, UNSPECIFIED CONSTIPATION TYPE: ICD-10-CM

## 2022-12-15 DIAGNOSIS — K21.9 GASTROESOPHAGEAL REFLUX DISEASE, UNSPECIFIED WHETHER ESOPHAGITIS PRESENT: ICD-10-CM

## 2022-12-15 DIAGNOSIS — R19.4 CHANGE IN BOWEL HABITS: Primary | ICD-10-CM

## 2022-12-15 DIAGNOSIS — R10.12 CHRONIC LUQ PAIN: ICD-10-CM

## 2022-12-15 DIAGNOSIS — G89.29 CHRONIC LUQ PAIN: ICD-10-CM

## 2022-12-15 DIAGNOSIS — Z87.19 HISTORY OF HIATAL HERNIA: ICD-10-CM

## 2022-12-15 PROBLEM — Z98.890 HISTORY OF COLONOSCOPY: Status: ACTIVE | Noted: 2022-10-19

## 2022-12-15 PROBLEM — K80.20 CHOLELITHIASIS: Status: ACTIVE | Noted: 2022-04-01

## 2022-12-15 PROBLEM — R11.0 NAUSEA: Status: ACTIVE | Noted: 2022-11-11

## 2022-12-15 PROBLEM — K59.03 DRUG-INDUCED CONSTIPATION: Status: ACTIVE | Noted: 2021-10-28

## 2022-12-15 PROBLEM — K59.04 CHRONIC IDIOPATHIC CONSTIPATION: Status: ACTIVE | Noted: 2022-11-11

## 2022-12-15 PROBLEM — K57.90 DIVERTICULOSIS: Status: ACTIVE | Noted: 2022-11-11

## 2022-12-15 PROBLEM — Z79.1 NSAID LONG-TERM USE: Status: ACTIVE | Noted: 2022-10-19

## 2022-12-15 PROBLEM — L03.019 PARONYCHIA OF INDEX FINGER: Status: ACTIVE | Noted: 2021-10-28

## 2022-12-15 PROBLEM — K57.30 DIVERTICULOSIS OF LARGE INTESTINE WITHOUT HEMORRHAGE: Status: ACTIVE | Noted: 2022-11-11

## 2022-12-15 PROCEDURE — 99204 OFFICE O/P NEW MOD 45 MIN: CPT | Performed by: PHYSICIAN ASSISTANT

## 2022-12-15 RX ORDER — PANTOPRAZOLE SODIUM 40 MG/1
40 TABLET, DELAYED RELEASE ORAL DAILY
COMMUNITY

## 2022-12-15 RX ORDER — LINACLOTIDE 290 UG/1
290 CAPSULE, GELATIN COATED ORAL DAILY
COMMUNITY
Start: 2022-11-17 | End: 2023-01-26 | Stop reason: DRUGHIGH

## 2022-12-15 RX ORDER — SODIUM, POTASSIUM,MAG SULFATES 17.5-3.13G
1 SOLUTION, RECONSTITUTED, ORAL ORAL EVERY 12 HOURS
Qty: 354 ML | Refills: 0 | Status: SHIPPED | OUTPATIENT
Start: 2022-12-15

## 2022-12-15 RX ORDER — SODIUM CHLORIDE 9 MG/ML
40 INJECTION, SOLUTION INTRAVENOUS AS NEEDED
Status: CANCELLED | OUTPATIENT
Start: 2023-01-19

## 2022-12-15 RX ORDER — DEXTROSE AND SODIUM CHLORIDE 5; .45 G/100ML; G/100ML
30 INJECTION, SOLUTION INTRAVENOUS CONTINUOUS PRN
Status: CANCELLED | OUTPATIENT
Start: 2023-01-19

## 2022-12-15 NOTE — PROGRESS NOTES
[facility]      Chief Complaint:   Chief Complaint   Patient presents with   • Abdominal Pain       Subjective     HPI:   Patient presents for evaluation of chronic left upper quadrant pain.  Patient states pain has been present for years.  Patient states she had her gallbladder removed last year and attempt to relieve pain however that was unsuccessful.  Patient also notes change in bowel habits over the last 1 year.  States significant constipation now.  Patient reports that spicy foods, acidic foods and alcohol worsen her left upper quadrant pain.  Her PCP has recently started her on Protonix and Linzess 290 mcg daily.  Patient states she took the Linzess for 5 days and had almost complete relief of her pain however this medication is so expensive that she cannot afford to take it daily.    Patient had a CT of the abdomen and pelvis December 2021 that showed diverticulosis without acute diverticulitis.  Patient also had a negative gastric emptying study.  Patient had esophagram that showed mild dysmotility without significant delay in emptying.  Patient states she had colonoscopy that showed diverticulosis and EGD found a large hiatal hernia.    Assessment/plan:  1.  Chronic left upper quadrant pain/GERD/Hiatal hernia- EGD for further evaluation.  Advised to continue gastric irritant avoidance.  She will continue Protonix 40 mg once daily.  2.  Change in bowel habits/constipation- patient will be scheduled for colonoscopy for further evaluation.  She may continue to take Linzess 290 mcg, provided with samples.  Also encouraged patient to begin fiber supplementation.  Follow-up will planned after endoscopies have been completed for results and recommendations based upon findings.    Past Medical History:   Past Medical History:   Diagnosis Date   • Acid reflux    • Allergic rhinitis    • Anxiety    • Depression    • Fibrocystic breast    • Senile osteoporosis    • Uterine  fibroid    • Yeast infection        Past Surgical History:  Past Surgical History:   Procedure Laterality Date   • BREAST BIOPSY Bilateral    • BREAST SURGERY     • CHOLECYSTECTOMY  04/2022   • SUBTOTAL HYSTERECTOMY      wo BSO       Family History:  Family History   Problem Relation Age of Onset   • Osteoporosis Mother    • Coronary artery disease Mother    • Cancer Other    • Diabetes Other    • Hypertension Other    • Ulcers Other    • Other Other         colon problems       Social History:   reports that she has quit smoking. She has never used smokeless tobacco. She reports current alcohol use. She reports that she does not use drugs.    Medications:   Prior to Admission medications    Medication Sig Start Date End Date Taking? Authorizing Provider   allopurinol (ZYLOPRIM) 300 MG tablet TAKE 1 TABLET BY MOUTH ONCE DAILY FOR GOUT 2/23/21  Yes ProviderSumeet MD   ALPRAZolam (XANAX) 0.25 MG tablet Take 0.25 mg by mouth. 5/5/16  Yes Provider, MD Sumeet   HYDROcodone-acetaminophen (NORCO)  MG per tablet Take 1 tablet by mouth 4 (Four) Times a Day. 4/24/21  Yes Provider, MD Sumeet   Linzess 290 MCG capsule capsule Take 290 mcg by mouth Daily. 11/17/22  Yes Provider, MD Sumeet   naproxen (NAPROSYN) 375 MG tablet Take 375 mg by mouth.   Yes Provider, Historical, MD   pantoprazole (PROTONIX) 40 MG EC tablet Take 40 mg by mouth Daily.   Yes Provider, MD Sumeet   vitamin D (ERGOCALCIFEROL) 62271 units capsule capsule  8/15/17  Yes Provider, MD Sumeet   zolpidem (AMBIEN) 10 MG tablet Take 10 mg by mouth. 5/5/16  Yes Provider, MD Sumeet   sodium-potassium-magnesium sulfates (Suprep Bowel Prep Kit) 17.5-3.13-1.6 GM/177ML solution oral solution Take 1 bottle by mouth Every 12 (Twelve) Hours. 12/15/22   Anabelle Rosas PA-C   ketoconazole (NIZORAL) 2 % cream Apply 1 application topically to the appropriate area as directed Daily. 4/21/22 12/15/22  Sophy Weston APRN  "      Allergies:  Erythromycin    ROS:    Review of Systems   Constitutional: Negative.  Negative for fever and unexpected weight change.   HENT: Negative.  Negative for trouble swallowing.    Eyes: Negative.    Respiratory: Negative.  Negative for shortness of breath.    Cardiovascular: Negative.  Negative for chest pain.   Gastrointestinal: Positive for abdominal pain, constipation and nausea (occasional). Negative for abdominal distention, anal bleeding, blood in stool, diarrhea, rectal pain and vomiting.   Endocrine: Negative.    Genitourinary: Negative.    Skin: Negative.    Neurological: Negative.    Hematological: Negative.    Psychiatric/Behavioral: Negative.      Objective     Blood pressure 140/80, pulse 81, height 165.1 cm (65\"), weight 84.4 kg (186 lb), not currently breastfeeding.    Physical Exam  Vitals and nursing note reviewed.   Constitutional:       Appearance: Normal appearance.   HENT:      Head: Normocephalic and atraumatic.   Eyes:      General: No scleral icterus.     Conjunctiva/sclera: Conjunctivae normal.   Cardiovascular:      Rate and Rhythm: Normal rate and regular rhythm.   Pulmonary:      Effort: Pulmonary effort is normal.      Breath sounds: Normal breath sounds.   Abdominal:      General: Bowel sounds are normal.      Palpations: Abdomen is soft.      Tenderness: There is abdominal tenderness in the left upper quadrant and left lower quadrant. There is no guarding or rebound.   Skin:     General: Skin is warm.   Neurological:      General: No focal deficit present.      Mental Status: She is alert.   Psychiatric:         Behavior: Behavior normal.          Assessment & Plan   Diagnoses and all orders for this visit:    1. Change in bowel habits (Primary)  -     Case Request; Standing  -     sodium chloride 0.9 % infusion 40 mL  -     dextrose 5 % and sodium chloride 0.45 % infusion  -     Case Request    2. Constipation, unspecified constipation type  -     Case Request; " Standing  -     sodium chloride 0.9 % infusion 40 mL  -     dextrose 5 % and sodium chloride 0.45 % infusion  -     Case Request    3. Chronic LUQ pain  -     Case Request; Standing  -     sodium chloride 0.9 % infusion 40 mL  -     dextrose 5 % and sodium chloride 0.45 % infusion  -     Case Request    4. Gastroesophageal reflux disease, unspecified whether esophagitis present  -     Case Request; Standing  -     sodium chloride 0.9 % infusion 40 mL  -     dextrose 5 % and sodium chloride 0.45 % infusion  -     Case Request    5. History of hiatal hernia  -     Case Request; Standing  -     sodium chloride 0.9 % infusion 40 mL  -     dextrose 5 % and sodium chloride 0.45 % infusion  -     Case Request    Other orders  -     Follow Anesthesia Guidelines / Protocol; Future  -     Obtain Informed Consent; Future  -     Implement Anesthesia Orders Day of Procedure; Standing  -     Obtain Informed Consent; Standing  -     POC Glucose Once; Standing  -     Insert Peripheral IV; Standing  -     sodium-potassium-magnesium sulfates (Suprep Bowel Prep Kit) 17.5-3.13-1.6 GM/177ML solution oral solution; Take 1 bottle by mouth Every 12 (Twelve) Hours.  Dispense: 354 mL; Refill: 0        ESOPHAGOGASTRODUODENOSCOPY (N/A), COLONOSCOPY (N/A)     Diagnosis Plan   1. Change in bowel habits  Case Request    sodium chloride 0.9 % infusion 40 mL    dextrose 5 % and sodium chloride 0.45 % infusion    Case Request      2. Constipation, unspecified constipation type  Case Request    sodium chloride 0.9 % infusion 40 mL    dextrose 5 % and sodium chloride 0.45 % infusion    Case Request      3. Chronic LUQ pain  Case Request    sodium chloride 0.9 % infusion 40 mL    dextrose 5 % and sodium chloride 0.45 % infusion    Case Request      4. Gastroesophageal reflux disease, unspecified whether esophagitis present  Case Request    sodium chloride 0.9 % infusion 40 mL    dextrose 5 % and sodium chloride 0.45 % infusion    Case Request      5.  History of hiatal hernia  Case Request    sodium chloride 0.9 % infusion 40 mL    dextrose 5 % and sodium chloride 0.45 % infusion    Case Request          Anticipated Surgical Procedure:  Orders Placed This Encounter   Procedures   • Obtain Informed Consent     Standing Status:   Future     Order Specific Question:   Informed Consent Given For     Answer:   egd and colonoscopy       The risks, benefits, and alternatives of this procedure have been discussed with the patient or the responsible party- the patient understands and agrees to proceed.

## 2023-01-19 ENCOUNTER — ANESTHESIA EVENT (OUTPATIENT)
Dept: GASTROENTEROLOGY | Facility: HOSPITAL | Age: 73
End: 2023-01-19
Payer: MEDICARE

## 2023-01-19 ENCOUNTER — HOSPITAL ENCOUNTER (OUTPATIENT)
Facility: HOSPITAL | Age: 73
Setting detail: HOSPITAL OUTPATIENT SURGERY
Discharge: HOME | End: 2023-01-19
Attending: INTERNAL MEDICINE | Admitting: PHYSICIAN ASSISTANT
Payer: MEDICARE

## 2023-01-19 ENCOUNTER — ANESTHESIA (OUTPATIENT)
Dept: GASTROENTEROLOGY | Facility: HOSPITAL | Age: 73
End: 2023-01-19
Payer: MEDICARE

## 2023-01-19 VITALS
HEART RATE: 71 BPM | RESPIRATION RATE: 18 BRPM | WEIGHT: 182 LBS | BODY MASS INDEX: 30.32 KG/M2 | HEIGHT: 65 IN | DIASTOLIC BLOOD PRESSURE: 72 MMHG | SYSTOLIC BLOOD PRESSURE: 118 MMHG | OXYGEN SATURATION: 96 % | TEMPERATURE: 97.2 F

## 2023-01-19 DIAGNOSIS — Z87.19 HISTORY OF HIATAL HERNIA: ICD-10-CM

## 2023-01-19 DIAGNOSIS — G89.29 CHRONIC LUQ PAIN: ICD-10-CM

## 2023-01-19 DIAGNOSIS — R10.12 CHRONIC LUQ PAIN: ICD-10-CM

## 2023-01-19 DIAGNOSIS — K59.00 CONSTIPATION, UNSPECIFIED CONSTIPATION TYPE: ICD-10-CM

## 2023-01-19 DIAGNOSIS — R19.4 CHANGE IN BOWEL HABITS: ICD-10-CM

## 2023-01-19 DIAGNOSIS — K21.9 GASTROESOPHAGEAL REFLUX DISEASE, UNSPECIFIED WHETHER ESOPHAGITIS PRESENT: ICD-10-CM

## 2023-01-19 PROCEDURE — 25010000002 PROPOFOL 10 MG/ML EMULSION

## 2023-01-19 PROCEDURE — 43239 EGD BIOPSY SINGLE/MULTIPLE: CPT | Performed by: INTERNAL MEDICINE

## 2023-01-19 PROCEDURE — 45380 COLONOSCOPY AND BIOPSY: CPT | Performed by: INTERNAL MEDICINE

## 2023-01-19 PROCEDURE — 88305 TISSUE EXAM BY PATHOLOGIST: CPT

## 2023-01-19 RX ORDER — PROPOFOL 10 MG/ML
VIAL (ML) INTRAVENOUS AS NEEDED
Status: DISCONTINUED | OUTPATIENT
Start: 2023-01-19 | End: 2023-01-19 | Stop reason: SURG

## 2023-01-19 RX ORDER — SODIUM CHLORIDE 9 MG/ML
40 INJECTION, SOLUTION INTRAVENOUS AS NEEDED
Status: DISCONTINUED | OUTPATIENT
Start: 2023-01-19 | End: 2023-01-19 | Stop reason: HOSPADM

## 2023-01-19 RX ORDER — LIDOCAINE HYDROCHLORIDE 20 MG/ML
INJECTION, SOLUTION INTRAVENOUS AS NEEDED
Status: DISCONTINUED | OUTPATIENT
Start: 2023-01-19 | End: 2023-01-19 | Stop reason: SURG

## 2023-01-19 RX ORDER — DEXTROSE AND SODIUM CHLORIDE 5; .45 G/100ML; G/100ML
30 INJECTION, SOLUTION INTRAVENOUS CONTINUOUS PRN
Status: DISCONTINUED | OUTPATIENT
Start: 2023-01-19 | End: 2023-01-19 | Stop reason: HOSPADM

## 2023-01-19 RX ADMIN — DEXTROSE AND SODIUM CHLORIDE 30 ML/HR: 5; 450 INJECTION, SOLUTION INTRAVENOUS at 12:41

## 2023-01-19 RX ADMIN — LIDOCAINE HYDROCHLORIDE 80 MG: 20 INJECTION, SOLUTION INTRAVENOUS at 13:27

## 2023-01-19 RX ADMIN — PROPOFOL 70 MG: 10 INJECTION, EMULSION INTRAVENOUS at 13:27

## 2023-01-19 RX ADMIN — PROPOFOL 200 MG: 10 INJECTION, EMULSION INTRAVENOUS at 13:28

## 2023-01-19 NOTE — H&P
[facility]      Chief Complaint:   No chief complaint on file.      Subjective     HPI:   Patient presents for evaluation of chronic left upper quadrant pain.  Patient states pain has been present for years.  Patient states she had her gallbladder removed last year and attempt to relieve pain however that was unsuccessful.  Patient also notes change in bowel habits over the last 1 year.  States significant constipation now.  Patient reports that spicy foods, acidic foods and alcohol worsen her left upper quadrant pain.  Her PCP has recently started her on Protonix and Linzess 290 mcg daily.  Patient states she took the Linzess for 5 days and had almost complete relief of her pain however this medication is so expensive that she cannot afford to take it daily.    Patient had a CT of the abdomen and pelvis December 2021 that showed diverticulosis without acute diverticulitis.  Patient also had a negative gastric emptying study.  Patient had esophagram that showed mild dysmotility without significant delay in emptying.  Patient states she had colonoscopy that showed diverticulosis and EGD found a large hiatal hernia.    Assessment/plan:  1.  Chronic left upper quadrant pain/GERD/Hiatal hernia- EGD for further evaluation.  Advised to continue gastric irritant avoidance.  She will continue Protonix 40 mg once daily.  2.  Change in bowel habits/constipation- patient will be scheduled for colonoscopy for further evaluation.  She may continue to take Linzess 290 mcg, provided with samples.  Also encouraged patient to begin fiber supplementation.  Follow-up will planned after endoscopies have been completed for results and recommendations based upon findings.    Past Medical History:   Past Medical History:   Diagnosis Date   • Acid reflux    • Allergic rhinitis    • Anxiety    • Depression    • Fibrocystic breast    • Senile osteoporosis    • Uterine fibroid    • Yeast infection         Past Surgical History:    Past Surgical History:   Procedure Laterality Date   • BREAST BIOPSY Bilateral    • BREAST SURGERY     • CHOLECYSTECTOMY  04/2022   • SUBTOTAL HYSTERECTOMY      wo BSO       Family History:  Family History   Problem Relation Age of Onset   • Osteoporosis Mother    • Coronary artery disease Mother    • Cancer Other    • Diabetes Other    • Hypertension Other    • Ulcers Other    • Other Other         colon problems       Social History:   reports that she has quit smoking. She has never used smokeless tobacco. She reports current alcohol use. She reports that she does not use drugs.    Medications:   Prior to Admission medications    Medication Sig Start Date End Date Taking? Authorizing Provider   allopurinol (ZYLOPRIM) 300 MG tablet TAKE 1 TABLET BY MOUTH ONCE DAILY FOR GOUT 2/23/21  Yes ProviderSumeet MD   ALPRAZolam (XANAX) 0.25 MG tablet Take 0.25 mg by mouth. 5/5/16  Yes ProviderSumeet MD   HYDROcodone-acetaminophen (NORCO)  MG per tablet Take 1 tablet by mouth 4 (Four) Times a Day. 4/24/21  Yes ProviderSumeet MD   Linzess 290 MCG capsule capsule Take 290 mcg by mouth Daily. 11/17/22  Yes Provider, MD Sumeet   naproxen (NAPROSYN) 375 MG tablet Take 375 mg by mouth.   Yes Provider, MD Sumeet   pantoprazole (PROTONIX) 40 MG EC tablet Take 40 mg by mouth Daily.   Yes Provider, MD Sumeet   vitamin D (ERGOCALCIFEROL) 41612 units capsule capsule  8/15/17  Yes Provider, MD Sumeet   zolpidem (AMBIEN) 10 MG tablet Take 10 mg by mouth. 5/5/16  Yes Provider, MD Sumeet   sodium-potassium-magnesium sulfates (Suprep Bowel Prep Kit) 17.5-3.13-1.6 GM/177ML solution oral solution Take 1 bottle by mouth Every 12 (Twelve) Hours. 12/15/22   Anabelle Rosas PA-C   ketoconazole (NIZORAL) 2 % cream Apply 1 application topically to the appropriate area as directed Daily. 4/21/22 12/15/22  Sophy Weston APRN  "      Allergies:  Erythromycin    ROS:    Review of Systems   Constitutional: Negative.  Negative for fever and unexpected weight change.   HENT: Negative.  Negative for trouble swallowing.    Eyes: Negative.    Respiratory: Negative.  Negative for shortness of breath.    Cardiovascular: Negative.  Negative for chest pain.   Gastrointestinal: Positive for abdominal pain, constipation and nausea (occasional). Negative for abdominal distention, anal bleeding, blood in stool, diarrhea, rectal pain and vomiting.   Endocrine: Negative.    Genitourinary: Negative.    Skin: Negative.    Neurological: Negative.    Hematological: Negative.    Psychiatric/Behavioral: Negative.      Objective     Blood pressure 156/87, pulse 73, temperature 97.3 °F (36.3 °C), temperature source Temporal, resp. rate 18, height 165.1 cm (65\"), weight 82.6 kg (182 lb), SpO2 98 %, not currently breastfeeding.    Physical Exam  Vitals and nursing note reviewed.   Constitutional:       Appearance: Normal appearance.   HENT:      Head: Normocephalic and atraumatic.   Eyes:      General: No scleral icterus.     Conjunctiva/sclera: Conjunctivae normal.   Cardiovascular:      Rate and Rhythm: Normal rate and regular rhythm.   Pulmonary:      Effort: Pulmonary effort is normal.      Breath sounds: Normal breath sounds.   Abdominal:      General: Bowel sounds are normal.      Palpations: Abdomen is soft.      Tenderness: There is abdominal tenderness in the left upper quadrant and left lower quadrant. There is no guarding or rebound.   Skin:     General: Skin is warm.   Neurological:      General: No focal deficit present.      Mental Status: She is alert.   Psychiatric:         Behavior: Behavior normal.          Assessment & Plan   Diagnoses and all orders for this visit:    1. Change in bowel habits  -     sodium chloride 0.9 % infusion 40 mL  -     dextrose 5 % and sodium chloride 0.45 % infusion    2. Chronic LUQ pain  -     sodium chloride 0.9 % " infusion 40 mL  -     dextrose 5 % and sodium chloride 0.45 % infusion    3. History of hiatal hernia  -     sodium chloride 0.9 % infusion 40 mL  -     dextrose 5 % and sodium chloride 0.45 % infusion    4. Constipation, unspecified constipation type  -     sodium chloride 0.9 % infusion 40 mL  -     dextrose 5 % and sodium chloride 0.45 % infusion    5. Gastroesophageal reflux disease, unspecified whether esophagitis present  -     sodium chloride 0.9 % infusion 40 mL  -     dextrose 5 % and sodium chloride 0.45 % infusion    Other orders  -     Implement Anesthesia Orders Day of Procedure; Standing  -     Obtain Informed Consent; Standing  -     POC Glucose Once; Standing  -     Insert Peripheral IV; Standing  -     Implement Anesthesia Orders Day of Procedure  -     Obtain Informed Consent  -     POC Glucose Once  -     Insert Peripheral IV        ESOPHAGOGASTRODUODENOSCOPY (N/A), COLONOSCOPY (N/A)     Diagnosis Plan   1. Change in bowel habits  sodium chloride 0.9 % infusion 40 mL    dextrose 5 % and sodium chloride 0.45 % infusion      2. Chronic LUQ pain  sodium chloride 0.9 % infusion 40 mL    dextrose 5 % and sodium chloride 0.45 % infusion      3. History of hiatal hernia  sodium chloride 0.9 % infusion 40 mL    dextrose 5 % and sodium chloride 0.45 % infusion      4. Constipation, unspecified constipation type  sodium chloride 0.9 % infusion 40 mL    dextrose 5 % and sodium chloride 0.45 % infusion      5. Gastroesophageal reflux disease, unspecified whether esophagitis present  sodium chloride 0.9 % infusion 40 mL    dextrose 5 % and sodium chloride 0.45 % infusion          Anticipated Surgical Procedure:  Orders Placed This Encounter   Procedures   • Implement Anesthesia Orders Day of Procedure     Standing Status:   Standing     Number of Occurrences:   1   • Obtain Informed Consent     Standing Status:   Standing     Number of Occurrences:   1     Order Specific Question:   Informed Consent Given For      Answer:   egd and colonoscopy   • POC Glucose Once     Prior to Procedure on ALL Diabetic Patients     Standing Status:   Standing     Number of Occurrences:   1   • Insert Peripheral IV     Standing Status:   Standing     Number of Occurrences:   1       The risks, benefits, and alternatives of this procedure have been discussed with the patient or the responsible party- the patient understands and agrees to proceed.

## 2023-01-19 NOTE — ANESTHESIA POSTPROCEDURE EVALUATION
Patient: Sabi Jarvis    Procedure Summary     Date: 01/19/23 Room / Location: [facility] ENDOSCOPY 3 / [facility] ENDOSCOPY    Anesthesia Start: 1321 Anesthesia Stop: 1346    Procedures:       ESOPHAGOGASTRODUODENOSCOPY      COLONOSCOPY Diagnosis:       Change in bowel habits      Constipation, unspecified constipation type      Chronic LUQ pain      Gastroesophageal reflux disease, unspecified whether esophagitis present      History of hiatal hernia      (Change in bowel habits [R19.4])      (Constipation, unspecified constipation type [K59.00])      (Chronic LUQ pain [R10.12, G89.29])      (Gastroesophageal reflux disease, unspecified whether esophagitis present [K21.9])      (History of hiatal hernia [Z87.19])    Surgeons: Barb Ewing MD Provider: Barrington Fish CRNA    Anesthesia Type: general ASA Status: 2          Anesthesia Type: general    Vitals  No vitals data found for the desired time range.          Post Anesthesia Care and Evaluation    Patient location during evaluation: PHASE II  Patient participation: complete - patient cannot participate  Level of consciousness: sleepy but conscious  Pain score: 0  Pain management: adequate    Airway patency: patent  Anesthetic complications: No anesthetic complications  PONV Status: none  Cardiovascular status: acceptable  Respiratory status: acceptable  Hydration status: acceptable  No anesthesia care post op

## 2023-01-19 NOTE — ANESTHESIA PREPROCEDURE EVALUATION
Anesthesia Evaluation     Patient summary reviewed and Nursing notes reviewed   NPO Solid Status: > 8 hours  NPO Liquid Status: > 2 hours           Airway   Mallampati: II  TM distance: >3 FB  Neck ROM: full  No difficulty expected  Dental - normal exam     Pulmonary - negative pulmonary ROS and normal exam   Cardiovascular - negative cardio ROS and normal exam  Exercise tolerance: good (4-7 METS)        Neuro/Psych  (+) numbness, psychiatric history Anxiety and Depression,    GI/Hepatic/Renal/Endo    (+) obesity,  GERD well controlled,      Musculoskeletal (-) negative ROS    Abdominal   (+) obese,    Substance History - negative use     OB/GYN negative ob/gyn ROS         Other                        Anesthesia Plan    ASA 2     general     intravenous induction     Anesthetic plan, risks, benefits, and alternatives have been provided, discussed and informed consent has been obtained with: patient.        CODE STATUS:

## 2023-01-24 LAB — REF LAB TEST METHOD: NORMAL

## 2023-01-26 ENCOUNTER — OFFICE VISIT (OUTPATIENT)
Dept: GASTROENTEROLOGY | Facility: CLINIC | Age: 73
End: 2023-01-26
Payer: MEDICARE

## 2023-01-26 VITALS
HEIGHT: 65 IN | HEART RATE: 82 BPM | SYSTOLIC BLOOD PRESSURE: 128 MMHG | DIASTOLIC BLOOD PRESSURE: 82 MMHG | BODY MASS INDEX: 30.66 KG/M2 | WEIGHT: 184 LBS

## 2023-01-26 DIAGNOSIS — K59.04 CHRONIC IDIOPATHIC CONSTIPATION: ICD-10-CM

## 2023-01-26 DIAGNOSIS — G89.29 CHRONIC LUQ PAIN: Primary | ICD-10-CM

## 2023-01-26 DIAGNOSIS — K44.9 HIATAL HERNIA: ICD-10-CM

## 2023-01-26 DIAGNOSIS — R10.12 CHRONIC LUQ PAIN: Primary | ICD-10-CM

## 2023-01-26 DIAGNOSIS — K21.00 GASTROESOPHAGEAL REFLUX DISEASE WITH ESOPHAGITIS WITHOUT HEMORRHAGE: ICD-10-CM

## 2023-01-26 DIAGNOSIS — K57.90 DIVERTICULOSIS: ICD-10-CM

## 2023-01-26 PROCEDURE — 99213 OFFICE O/P EST LOW 20 MIN: CPT | Performed by: PHYSICIAN ASSISTANT

## 2023-01-26 NOTE — PROGRESS NOTES
[facility]      Chief Complaint:   Chief Complaint   Patient presents with   • Follow-up       Subjective     HPI:   Patient presents for follow-up after having endoscopy completed due to chronic left upper quadrant pain, GERD, hiatal hernia, change in bowel habits/constipation.  Patient was previously started on Protonix 40 mg once daily and Linzess 290 mcg daily by her PCP.  Patient reports today that she is doing better.  She states that she could not afford the co-pay of Linzess but when she was taking the medication most of her left upper quadrant pain resolved.  Patient previously reported that her left upper quadrant pain has been present for several years.    EGD showed a medium size hiatal hernia, grade 1 esophagitis and gastritis.  Antral biopsy shows reactive gastropathy and GE junction biopsy shows reactive gastric and squamous mucosa, negative for specialized Anglin's mucosa.  Colonoscopy showed multiple medium mouth diverticula in the sigmoid and descending colon with a patchy area of mild erythematous mucosa in the sigmoid colon and nonbleeding internal hemorrhoids.  Biopsy of the sigmoid mucosa shows no significant histologic abnormality.    Assessment/plan:  1.  Chronic idiopathic constipation- patient will be given samples of Linzess 290 mcg daily.  I will also change her prescription to a 90-day prescription and have her try to use the co-pay assistance coupon.  Patient will contact the office if she is unable to use this coupon.  2.  Diverticulosis- stressed the importance of keeping her constipation under control.  Continue Linzess.  3.  Hiatal hernia- continue Protonix 40 mg once daily.  4.  Esophagitis/gastritis-continue Protonix 40 mg once daily.  Follow-up will be planned for 3 months.  Advised patient to contact the office if she has difficulty obtaining her Linzess.  Patient advised to contact the office sooner than 3 months for any new,  worsening or changing symptoms. Recommend repeat screening colonoscopy in 10 years or sooner for problems.     Past Medical History:   Past Medical History:   Diagnosis Date   • Acid reflux    • Allergic rhinitis    • Anxiety    • Depression    • Fibrocystic breast    • Senile osteoporosis    • Uterine fibroid    • Yeast infection        Past Surgical History:    Past Surgical History:   Procedure Laterality Date   • BREAST BIOPSY Bilateral    • BREAST SURGERY     • CHOLECYSTECTOMY  04/2022   • SUBTOTAL HYSTERECTOMY      wo BSO       Family History:  Family History   Problem Relation Age of Onset   • Osteoporosis Mother    • Coronary artery disease Mother    • Cancer Other    • Diabetes Other    • Hypertension Other    • Ulcers Other    • Other Other         colon problems       Social History:   reports that she has quit smoking. She has never used smokeless tobacco. She reports current alcohol use. She reports that she does not use drugs.    Medications:   Prior to Admission medications    Medication Sig Start Date End Date Taking? Authorizing Provider   allopurinol (ZYLOPRIM) 300 MG tablet TAKE 1 TABLET BY MOUTH ONCE DAILY FOR GOUT 2/23/21   Sumeet Gaxiola MD   ALPRAZolam (XANAX) 0.25 MG tablet Take 0.25 mg by mouth. 5/5/16   Sumeet Gaxiola MD   HYDROcodone-acetaminophen (NORCO)  MG per tablet Take 1 tablet by mouth 4 (Four) Times a Day. 4/24/21   Sumeet Gaxiola MD   linaclotide (Linzess) 290 MCG capsule capsule Take 1 capsule by mouth Every Morning Before Breakfast. 1/26/23   Anabelle Rosas PA-C   naproxen (NAPROSYN) 375 MG tablet Take 375 mg by mouth.    Sumeet Gaxiola MD   pantoprazole (PROTONIX) 40 MG EC tablet Take 40 mg by mouth Daily.    Sumeet Gaxiola MD   sodium-potassium-magnesium sulfates (Suprep Bowel Prep Kit) 17.5-3.13-1.6 GM/177ML solution oral solution Take 1 bottle by mouth Every 12 (Twelve) Hours. 12/15/22   Anabelle Rosas PA-C   vitamin D  "(ERGOCALCIFEROL) 75649 units capsule capsule 50,000 Units Every 7 (Seven) Days. 8/15/17   Provider, MD Sumeet   zolpidem (AMBIEN) 10 MG tablet Take 10 mg by mouth. 5/5/16   Provider, MD Sumeet   Linzess 290 MCG capsule capsule Take 290 mcg by mouth Daily. 11/17/22 1/26/23  Provider, MD Sumeet       Allergies:  Erythromycin    ROS:    Review of Systems   Constitutional: Negative.  Negative for fever and unexpected weight change.   HENT: Negative.  Negative for trouble swallowing.    Eyes: Negative.    Respiratory: Negative.  Negative for shortness of breath.    Cardiovascular: Negative.  Negative for chest pain.   Gastrointestinal: Positive for abdominal pain (improved) and constipation (controlled when she can get Linzess).   Endocrine: Negative.    Genitourinary: Negative.    Skin: Negative.    Neurological: Negative.    Hematological: Negative.    Psychiatric/Behavioral: Negative.      Objective     Blood pressure 128/82, pulse 82, height 165.1 cm (65\"), weight 83.5 kg (184 lb), not currently breastfeeding.    Physical Exam  Vitals and nursing note reviewed.   Constitutional:       Appearance: Normal appearance.   HENT:      Head: Normocephalic and atraumatic.   Eyes:      General: No scleral icterus.  Pulmonary:      Effort: Pulmonary effort is normal.   Abdominal:      Palpations: Abdomen is soft.      Tenderness: There is no abdominal tenderness.   Neurological:      General: No focal deficit present.      Mental Status: She is alert.   Psychiatric:         Behavior: Behavior normal.          Assessment & Plan   Diagnoses and all orders for this visit:    1. Chronic LUQ pain (Primary)    2. Gastroesophageal reflux disease with esophagitis without hemorrhage    3. Hiatal hernia    4. Chronic idiopathic constipation    5. Diverticulosis    Other orders  -     linaclotide (Linzess) 290 MCG capsule capsule; Take 1 capsule by mouth Every Morning Before Breakfast.  Dispense: 90 capsule; Refill: " 3        * Surgery not found *     Diagnosis Plan   1. Chronic LUQ pain        2. Gastroesophageal reflux disease with esophagitis without hemorrhage        3. Hiatal hernia        4. Chronic idiopathic constipation        5. Diverticulosis            Anticipated Surgical Procedure:  No orders of the defined types were placed in this encounter.      The risks, benefits, and alternatives of this procedure have been discussed with the patient or the responsible party- the patient understands and agrees to proceed.

## 2023-05-03 ENCOUNTER — OFFICE VISIT (OUTPATIENT)
Dept: GASTROENTEROLOGY | Facility: CLINIC | Age: 73
End: 2023-05-03
Payer: MEDICARE

## 2023-05-03 VITALS
DIASTOLIC BLOOD PRESSURE: 80 MMHG | BODY MASS INDEX: 30.89 KG/M2 | WEIGHT: 185.4 LBS | HEART RATE: 68 BPM | HEIGHT: 65 IN | SYSTOLIC BLOOD PRESSURE: 120 MMHG

## 2023-05-03 DIAGNOSIS — K59.04 CHRONIC IDIOPATHIC CONSTIPATION: ICD-10-CM

## 2023-05-03 DIAGNOSIS — K44.9 HIATAL HERNIA: ICD-10-CM

## 2023-05-03 DIAGNOSIS — K21.00 GASTROESOPHAGEAL REFLUX DISEASE WITH ESOPHAGITIS WITHOUT HEMORRHAGE: ICD-10-CM

## 2023-05-03 DIAGNOSIS — R10.12 CHRONIC LUQ PAIN: Primary | ICD-10-CM

## 2023-05-03 DIAGNOSIS — G89.29 CHRONIC LUQ PAIN: Primary | ICD-10-CM

## 2023-05-03 NOTE — PROGRESS NOTES
[facility]      Chief Complaint:   Chief Complaint   Patient presents with   • Follow-up       Subjective     HPI:   Patient presents for 3-month follow-up related to chronic constipation, episodic left upper quadrant abdominal pain, GERD and hiatal hernia.  Patient is currently taking Protonix 40 mg once daily and Linzess 290 mcg once daily for constipation.  Patient has been unable to afford Linzess due to her insurance deductible.  Patient states she is getting samples here or from her PCP.  Patient states her left upper quadrant pain resolves as long as her constipation is controlled.  Patient reports that her GERD symptoms are well controlled on Protonix.  Patient denies any new symptoms.    Assessment/plan:  1.  Chronic constipation  2.  GERD with esophagitis  3.  Hiatal hernia  4.  Diverticulosis    Patient will continue Linzess 290 mcg once daily for constipation, samples given.  She will also continue Protonix 40 mg once daily.  Patient will be scheduled for routine follow-up in 6 months but was advised to contact the office sooner for any new or worsening symptoms.    Past Medical History:   Past Medical History:   Diagnosis Date   • Acid reflux    • Allergic rhinitis    • Anxiety    • Depression    • Fibrocystic breast    • Senile osteoporosis    • Uterine fibroid    • Yeast infection        Past Surgical History:    Past Surgical History:   Procedure Laterality Date   • BREAST BIOPSY Bilateral    • BREAST SURGERY     • CHOLECYSTECTOMY  04/2022   • COLONOSCOPY N/A 1/19/2023    Procedure: COLONOSCOPY;  Surgeon: Barb Ewing MD;  Location: [facility] ENDOSCOPY;  Service: Gastroenterology;  Laterality: N/A;   • ENDOSCOPY N/A 1/19/2023    Procedure: ESOPHAGOGASTRODUODENOSCOPY;  Surgeon: Barb Ewing MD;  Location: [facility] ENDOSCOPY;  Service: Gastroenterology;  Laterality: N/A;   • SUBTOTAL HYSTERECTOMY      wo BSO       Family History:  Family History    Problem Relation Age of Onset   • Osteoporosis Mother    • Coronary artery disease Mother    • Cancer Other    • Diabetes Other    • Hypertension Other    • Ulcers Other    • Other Other         colon problems       Social History:   reports that she has quit smoking. She has never used smokeless tobacco. She reports current alcohol use. She reports that she does not use drugs.    Medications:   Prior to Admission medications    Medication Sig Start Date End Date Taking? Authorizing Provider   allopurinol (ZYLOPRIM) 300 MG tablet TAKE 1 TABLET BY MOUTH ONCE DAILY FOR GOUT 2/23/21  Yes Sumeet Gaxiola MD   ALPRAZolam (XANAX) 0.25 MG tablet Take 1 tablet by mouth. 5/5/16  Yes Sumeet Gaxiola MD   HYDROcodone-acetaminophen (NORCO)  MG per tablet Take 1 tablet by mouth 4 (Four) Times a Day. 4/24/21  Yes Sumeet Gaxiola MD   linaclotide (Linzess) 290 MCG capsule capsule Take 1 capsule by mouth Every Morning Before Breakfast. 1/26/23  Yes Anabelle Rosas PA-C   naproxen (NAPROSYN) 375 MG tablet Take 1 tablet by mouth.   Yes ProviderSumeet MD   pantoprazole (PROTONIX) 40 MG EC tablet Take 1 tablet by mouth Daily.   Yes ProviderSumeet MD   vitamin D (ERGOCALCIFEROL) 12425 units capsule capsule 1 capsule Every 7 (Seven) Days. 8/15/17  Yes ProviderSumeet MD   zolpidem (AMBIEN) 10 MG tablet Take 1 tablet by mouth. 5/5/16  Yes ProviderSumeet MD   sodium-potassium-magnesium sulfates (Suprep Bowel Prep Kit) 17.5-3.13-1.6 GM/177ML solution oral solution Take 1 bottle by mouth Every 12 (Twelve) Hours. 12/15/22 5/3/23  Anabelle Rosas PA-C       Allergies:  Erythromycin    ROS:    Review of Systems   Constitutional: Negative.  Negative for fever and unexpected weight change.   HENT: Negative.    Eyes: Negative.    Respiratory: Negative.  Negative for shortness of breath.    Cardiovascular: Negative.  Negative for chest pain.   Gastrointestinal: Positive for constipation.  "  Endocrine: Negative.    Genitourinary: Negative.    Skin: Negative.    Neurological: Negative.    Hematological: Negative.    Psychiatric/Behavioral: Negative.      Objective     Blood pressure 120/80, pulse 68, height 165.1 cm (65\"), weight 84.1 kg (185 lb 6.4 oz), not currently breastfeeding.    Physical Exam  Vitals and nursing note reviewed.   Constitutional:       Appearance: Normal appearance.   HENT:      Head: Normocephalic and atraumatic.   Cardiovascular:      Rate and Rhythm: Normal rate and regular rhythm.   Pulmonary:      Effort: Pulmonary effort is normal.      Breath sounds: Normal breath sounds.   Abdominal:      General: Bowel sounds are normal.      Palpations: Abdomen is soft.      Tenderness: There is no abdominal tenderness.   Skin:     General: Skin is warm.   Neurological:      General: No focal deficit present.      Mental Status: She is alert.   Psychiatric:         Behavior: Behavior normal.          Assessment & Plan   Diagnoses and all orders for this visit:    1. Chronic LUQ pain (Primary)    2. Gastroesophageal reflux disease with esophagitis without hemorrhage    3. Hiatal hernia    4. Chronic idiopathic constipation        * Surgery not found *     Diagnosis Plan   1. Chronic LUQ pain        2. Gastroesophageal reflux disease with esophagitis without hemorrhage        3. Hiatal hernia        4. Chronic idiopathic constipation            Anticipated Surgical Procedure:  No orders of the defined types were placed in this encounter.      The risks, benefits, and alternatives of this procedure have been discussed with the patient or the responsible party- the patient understands and agrees to proceed.                                                                "

## (undated) DEVICE — BITEBLOCK ENDO W/STRAP 60F A/ LF DISP

## (undated) DEVICE — MSK ENDO PORT O2 POM ELITE CURAPLEX A/

## (undated) DEVICE — SINGLE-USE BIOPSY FORCEPS: Brand: RADIAL JAW 4